# Patient Record
Sex: MALE | Race: WHITE | NOT HISPANIC OR LATINO | URBAN - METROPOLITAN AREA
[De-identification: names, ages, dates, MRNs, and addresses within clinical notes are randomized per-mention and may not be internally consistent; named-entity substitution may affect disease eponyms.]

---

## 2017-09-30 ENCOUNTER — LAB CONVERSION - ENCOUNTER (OUTPATIENT)
Dept: OTHER | Facility: OTHER | Age: 32
End: 2017-09-30

## 2017-09-30 LAB
A/G RATIO (HISTORICAL): 2.2 (CALC) (ref 1–2.5)
ALBUMIN SERPL BCP-MCNC: 4.9 G/DL (ref 3.6–5.1)
ALP SERPL-CCNC: 39 U/L (ref 40–115)
ALT SERPL W P-5'-P-CCNC: 32 U/L (ref 9–46)
AST SERPL W P-5'-P-CCNC: 22 U/L (ref 10–40)
BASOPHILS # BLD AUTO: 0.7 %
BASOPHILS # BLD AUTO: 29 CELLS/UL (ref 0–200)
BILIRUB SERPL-MCNC: 0.9 MG/DL (ref 0.2–1.2)
BUN SERPL-MCNC: 11 MG/DL (ref 7–25)
BUN/CREA RATIO (HISTORICAL): ABNORMAL (CALC) (ref 6–22)
CALCIUM SERPL-MCNC: 9.5 MG/DL (ref 8.6–10.3)
CHLORIDE SERPL-SCNC: 105 MMOL/L (ref 98–110)
CHOLEST SERPL-MCNC: 161 MG/DL
CHOLEST/HDLC SERPL: 2.3 (CALC)
CO2 SERPL-SCNC: 29 MMOL/L (ref 20–31)
CREAT SERPL-MCNC: 0.91 MG/DL (ref 0.6–1.35)
DEPRECATED RDW RBC AUTO: 12.4 % (ref 11–15)
EGFR AFRICAN AMERICAN (HISTORICAL): 130 ML/MIN/1.73M2
EGFR-AMERICAN CALC (HISTORICAL): 112 ML/MIN/1.73M2
EOSINOPHIL # BLD AUTO: 2 %
EOSINOPHIL # BLD AUTO: 82 CELLS/UL (ref 15–500)
GAMMA GLOBULIN (HISTORICAL): 2.2 G/DL (CALC) (ref 1.9–3.7)
GLUCOSE (HISTORICAL): 86 MG/DL (ref 65–99)
HCT VFR BLD AUTO: 38.6 % (ref 38.5–50)
HDLC SERPL-MCNC: 70 MG/DL
HGB BLD-MCNC: 13.6 G/DL (ref 13.2–17.1)
LDL CHOLESTEROL (HISTORICAL): 72 MG/DL (CALC)
LYMPHOCYTES # BLD AUTO: 1464 CELLS/UL (ref 850–3900)
LYMPHOCYTES # BLD AUTO: 35.7 %
MCH RBC QN AUTO: 31 PG (ref 27–33)
MCHC RBC AUTO-ENTMCNC: 35.2 G/DL (ref 32–36)
MCV RBC AUTO: 87.9 FL (ref 80–100)
MONOCYTES # BLD AUTO: 472 CELLS/UL (ref 200–950)
MONOCYTES (HISTORICAL): 11.5 %
NEUTROPHILS # BLD AUTO: 2054 CELLS/UL (ref 1500–7800)
NEUTROPHILS # BLD AUTO: 50.1 %
NON-HDL-CHOL (CHOL-HDL) (HISTORICAL): 91 MG/DL (CALC)
PLATELET # BLD AUTO: 174 THOUSAND/UL (ref 140–400)
PMV BLD AUTO: 11.3 FL (ref 7.5–12.5)
POTASSIUM SERPL-SCNC: 4.2 MMOL/L (ref 3.5–5.3)
RBC # BLD AUTO: 4.39 MILLION/UL (ref 4.2–5.8)
SODIUM SERPL-SCNC: 139 MMOL/L (ref 135–146)
TOTAL PROTEIN (HISTORICAL): 7.1 G/DL (ref 6.1–8.1)
TRIGL SERPL-MCNC: 109 MG/DL
TSH SERPL DL<=0.05 MIU/L-ACNC: 2.38 MIU/L (ref 0.4–4.5)
WBC # BLD AUTO: 4.1 THOUSAND/UL (ref 3.8–10.8)

## 2017-10-02 ENCOUNTER — GENERIC CONVERSION - ENCOUNTER (OUTPATIENT)
Dept: OTHER | Facility: OTHER | Age: 32
End: 2017-10-02

## 2017-10-03 ENCOUNTER — ALLSCRIPTS OFFICE VISIT (OUTPATIENT)
Dept: OTHER | Facility: OTHER | Age: 32
End: 2017-10-03

## 2018-01-14 NOTE — RESULT NOTES
Verified Results  (1) CBC/PLT/DIFF 17Sep2016 09:47AM Abigail Stewart     Test Name Result Flag Reference   WHITE BLOOD CELL COUNT 3 9 Thousand/uL  3 8-10 8   RED BLOOD CELL COUNT 4 35 Million/uL  4 20-5 80   HEMOGLOBIN 13 1 g/dL L 13 2-17 1   HEMATOCRIT 39 6 %  38 5-50 0   MCV 91 0 fL  80 0-100 0   MCH 30 2 pg  27 0-33 0   MCHC 33 2 g/dL  32 0-36 0   RDW 13 0 %  11 0-15 0   PLATELET COUNT 402 Thousand/uL  140-400   MPV 9 4 fL  7 5-11 5   ABSOLUTE NEUTROPHILS 1736 cells/uL  2292-2483   ABSOLUTE LYMPHOCYTES 1603 cells/uL  850-3900   ABSOLUTE MONOCYTES 441 cells/uL  200-950   ABSOLUTE EOSINOPHILS 109 cells/uL     ABSOLUTE BASOPHILS 12 cells/uL  0-200   NEUTROPHILS 44 5 %     LYMPHOCYTES 41 1 %     MONOCYTES 11 3 %     EOSINOPHILS 2 8 %     BASOPHILS 0 3 %       (1) COMPREHENSIVE METABOLIC PANEL [id number] 81:86JB Abigail Stewart     Test Name Result Flag Reference   GLUCOSE 85 mg/dL  65-99   Fasting reference interval   UREA NITROGEN (BUN) 13 mg/dL  7-25   CREATININE 0 98 mg/dL  0 60-1 35   eGFR NON-AFR   AMERICAN 103 mL/min/1 73m2  > OR = 60   eGFR AFRICAN AMERICAN 119 mL/min/1 73m2  > OR = 60   BUN/CREATININE RATIO   3-15   NOT APPLICABLE (calc)   SODIUM 136 mmol/L  135-146   POTASSIUM 4 0 mmol/L  3 5-5 3   CHLORIDE 101 mmol/L     CARBON DIOXIDE 27 mmol/L  20-31   CALCIUM 9 3 mg/dL  8 6-10 3   PROTEIN, TOTAL 7 0 g/dL  6 1-8 1   ALBUMIN 5 0 g/dL  3 6-5 1   GLOBULIN 2 0 g/dL (calc)  1 9-3 7   ALBUMIN/GLOBULIN RATIO 2 5 (calc)  1 0-2 5   BILIRUBIN, TOTAL 1 2 mg/dL  0 2-1 2   ALKALINE PHOSPHATASE 41 U/L     AST 19 U/L  10-40   ALT 28 U/L  9-46     (1) LIPID PANEL, FASTING 17Sep2016 09:47AM Abigail Stewart     Test Name Result Flag Reference   CHOLESTEROL, TOTAL 158 mg/dL  125-200   HDL CHOLESTEROL 97 mg/dL  > OR = 40   TRIGLICERIDES 61 mg/dL  <366   LDL-CHOLESTEROL 49 mg/dL (calc)  <130   Desirable range <100 mg/dL for patients with CHD or  diabetes and <70 mg/dL for diabetic patients with  known heart disease  CHOL/HDLC RATIO 1 6 (calc)  < OR = 5 0   NON HDL CHOLESTEROL 61 mg/dL (calc)     Target for non-HDL cholesterol is 30 mg/dL higher than   LDL cholesterol target  (Q) TSH, 3RD GENERATION 22Pnf0589 09:47AM Liza Shultz   REPORT COMMENT:  FASTING:YES     Test Name Result Flag Reference   TSH 1 91 mIU/L  0 40-4 50       Plan  Benign essential hypertension    · Losartan Potassium 25 MG Oral Tablet;  Take 1 tablet daily  Dyslipidemia    · Atorvastatin Calcium 10 MG Oral Tablet; take 1 tablet by mouth daily

## 2018-01-15 NOTE — PROGRESS NOTES
Assessment    1  Benign essential hypertension (401 1) (I10)   2  Dyslipidemia (272 4) (E78 5)   3  Needs flu shot (V04 81) (Z23)   4  Encounter for preventive health examination (V70 0) (Z00 00)    Plan  Benign essential hypertension, Dyslipidemia, Health Maintenance    · (1) CBC/PLT/DIFF; Status:Active; Requested QDZ:[id number];    · (1) COMPREHENSIVE METABOLIC PANEL; Status:Active; Requested NKH:[id number];    · (1) LIPID PANEL, FASTING; Status:Active; Requested for:05Oct2017;    · (1) TSH WITH FT4 REFLEX; Status:Active; Requested WOX:[id number];   Needs flu shot    · Fluzone Quadrivalent 0 5 ML Intramuscular Suspension; INJECT 0 5  ML  Intramuscular; To Be Done: [id number]  PMH: Dysfunction of left eustachian tube    · Meclizine HCl - 12 5 MG Oral Tablet    Discussion/Summary  Impression: health maintenance visit, healthy adult male  Currently, he eats a healthy diet and has an adequate exercise regimen  Testicular cancer screening: the risks and benefits of testicular cancer screening were discussed, self testicular exam technique was taught, monthly self testicular exam was advised and clinical testicular exam was done today  Colorectal cancer screening: colorectal cancer screening is not indicated  - Annual physical examination completed  Patient is generally in good health  Patient is current on tetanus immunization which was last done in 2012  -Hypertension is well-controlled on losartan 25 mg once daily    -Hyperlipidemia is well controlled on current dose of atorvastatin 10 mg once daily    - Flu vaccine administered in the office  -Patient given a repeat lab order for one year  If he does run out of medications and has not establish with a new PCP before that time then he can simply call the office for refills  He will be due for refills until March 2017  Possible side effects of new medications were reviewed with the patient/guardian today     The patient was counseled regarding diagnostic results, instructions for management, prognosis, impressions  Chief Complaint  Preventative visit  History of Present Illness  HM, Adult Male: The patient is being seen for a health maintenance evaluation  General Health: The patient's health since the last visit is described as good  He has regular dental visits  He denies vision problems  He denies hearing loss  Immunizations status: not up to date  Lifestyle:  He consumes a diverse and healthy diet  He does not have any weight concerns  He exercises regularly  He does not use tobacco  He denies alcohol use  Screening: cancer screening reviewed and current  metabolic screening reviewed and current  risk screening reviewed and current  HPI: Patient presents for annual physical examination  Patient's presents for followup of hypertension and hyperlipidemia  No particular complaints or concerns  Patient will be moving to [facility]  He will need to find a new PCP  Cholesterol profile remains very well-controlled with a total cholesterol 158, HDL of 97, triglycerides of 61 and LDL of 49  He continues on atorvastatin 10 mg once daily  Hypertension is well-controlled on losartan 25 mg once daily  Patient would like to receive flu vaccination      Review of Systems    Constitutional: No fever or chills, feels well, no tiredness, no recent weight gain or weight loss  Eyes: No complaints of eye pain, no red eyes, no discharge from eyes, no itchy eyes  ENT: no complaints of earache, no hearing loss, no nosebleeds, no nasal discharge, no sore throat, no hoarseness  Cardiovascular: No complaints of slow heart rate, no fast heart rate, no chest pain, no palpitations, no leg claudication, no lower extremity  Respiratory: No complaints of shortness of breath, no wheezing, no cough, no SOB on exertion, no orthopnea or PND  Gastrointestinal: No complaints of abdominal pain, no constipation, no nausea or vomiting, no diarrhea or bloody stools  Genitourinary: No complaints of dysuria, no incontinence, no hesitancy, no nocturia, no genital lesion, no testicular pain  Musculoskeletal: No complaints of arthralgia, no myalgias, no joint swelling or stiffness, no limb pain or swelling  Integumentary: No complaints of skin rash or skin lesions, no itching, no skin wound, no dry skin  Neurological: No compliants of headache, no confusion, no convulsions, no numbness or tingling, no dizziness or fainting, no limb weakness, no difficulty walking  Psychiatric: Is not suicidal, no sleep disturbances, no anxiety or depression, no change in personality, no emotional problems  Endocrine: No complaints of proptosis, no hot flashes, no muscle weakness, no erectile dysfunction, no deepening of the voice, no feelings of weakness  Hematologic/Lymphatic: No complaints of swollen glands, no swollen glands in the neck, does not bleed easily, no easy bruising  Active Problems    1  Abnormal Liver Function Test (790 6)   2  Anxiety disorder (300 00) (F41 9)   3  Benign essential hypertension (401 1) (I10)   4  Dyslipidemia (272 4) (E78 5)    Past Medical History    · History of Closed Bone Fracture (829 0)   · History of Cough (786 2) (R05)    Family History  Mother    · No pertinent family history  Family History    · Family history of Heart Disease (V17 49)   · Family history of Hyperlipidemia    Social History    · Never A Smoker    Current Meds   1  Atorvastatin Calcium 10 MG Oral Tablet; take 1 tablet by mouth daily; Therapy: 11LIR4630 to (Evaluate:18Mar2017)  Requested for: 67Oyf8671; Last   Rx:98Tkl9605 Ordered   2  Losartan Potassium 25 MG Oral Tablet; Take 1 tablet daily; Therapy: 89CKD2809 to (Evaluate:18Mar2017)  Requested for: 55Uhv7208; Last   Rx:65Mak1601 Ordered   3  Meclizine HCl - 12 5 MG Oral Tablet; TAKE 1 TABLET Every 8 hours PRN dizziness;    Therapy: 76RHN5195 to (Evaluate:30Mar2016)  Requested for: 06IUW7336; Last   Rx:10Mar2016 Ordered   4  Nasonex 50 MCG/ACT Nasal Suspension; USE 2 SPRAYS IN EACH NOSTRIL ONCE   DAILY; Therapy: 80AYG0706 to (Last Rx:10Mar2016)  Requested for: 68DRQ6033 Ordered    Allergies    1  No Known Drug Allergies    Vitals   Recorded: 68MDH6725 79:32WB   Systolic 403   Diastolic 72   Heart Rate 76   Respiration 14   Height 5 ft 6 in   Weight 147 lb    BMI Calculated 23 73   BSA Calculated 1 76     Physical Exam    Constitutional   General appearance: No acute distress, well appearing and well nourished  Eyes   Conjunctiva and lids: No erythema, swelling or discharge  Pupils and irises: Equal, round, reactive to light  Ophthalmoscopic examination: Normal fundi and optic discs  Ears, Nose, Mouth, and Throat   External inspection of ears and nose: Normal     Otoscopic examination: Tympanic membranes translucent with normal light reflex  Canals patent without erythema  Hearing: Normal     Nasal mucosa, septum, and turbinates: Normal without edema or erythema  Lips, teeth, and gums: Normal, good dentition  Oropharynx: Normal with no erythema, edema, exudate or lesions  Neck   Neck: Supple, symmetric, trachea midline, no masses  Thyroid: Normal, no thyromegaly  Pulmonary   Respiratory effort: No increased work of breathing or signs of respiratory distress  Percussion of chest: Normal     Palpation of chest: Normal     Auscultation of lungs: Clear to auscultation  Cardiovascular   Palpation of heart: Normal PMI, no thrills  Auscultation of heart: Normal rate and rhythm, normal S1 and S2, no murmurs  Carotid pulses: 2+ bilaterally  Abdominal aorta: Normal     Femoral pulses: 2+ bilaterally  Pedal pulses: 2+ bilaterally  Examination of extremities for edema and/or varicosities: Normal     Chest   Breasts: Normal, no dimpling or skin changes appreciated  Palpation of breasts and axillae: Normal, no masses palpated      Chest: Normal     Abdomen   Abdomen: Non-tender, no masses  Liver and spleen: No hepatomegaly or splenomegaly  Examination for hernias: No hernias appreciated  Genitourinary   Scrotal contents: Normal testes, no masses  Penis: Normal, no lesions  Lymphatic   Palpation of lymph nodes in neck: No lymphadenopathy  Palpation of lymph nodes in axillae: No lymphadenopathy  Palpation of lymph nodes in groin: No lymphadenopathy  Palpation of lymph nodes in other areas: No lymphadenopathy  Musculoskeletal   Gait and station: Normal     Inspection/palpation of digits and nails: Normal without clubbing or cyanosis  Inspection/palpation of joints, bones, and muscles: Normal     Range of motion: Normal     Stability: Normal     Muscle strength/tone: Normal     Skin   Skin and subcutaneous tissue: Normal without rashes or lesions  Palpation of skin and subcutaneous tissue: Normal turgor  Neurologic   Cranial nerves: Cranial nerves 2-12 intact  Reflexes: 2+ and symmetric  Sensation: No sensory loss  Psychiatric   Judgment and insight: Normal     Orientation to person, place and time: Normal     Recent and remote memory: Intact  Mood and affect: Normal        Results/Data  PHQ-2 Adult Depression Screening 50Qmx7358 08:03AM User, Ahs     Test Name Result Flag Reference   PHQ-2 Adult Depression Score 0     Over the last two weeks, how often have you been bothered by any of the following problems?   Little interest or pleasure in doing things: Not at all - 0  Feeling down, depressed, or hopeless: Not at all - 0   PHQ-2 Adult Depression Screening Negative         Signatures   Electronically signed by : Dougie Gama DO; Oct  5 2016  8:30AM EST                       (Author)

## 2018-01-15 NOTE — PROGRESS NOTES
Assessment    1  Encounter for preventive health examination (V70 0) (Z00 00)   2  Benign essential hypertension (401 1) (I10)   3  Needs flu shot (V04 81) (Z23)   4  Dyslipidemia (272 4) (E78 5)    Plan  Benign essential hypertension    · Losartan Potassium 25 MG Oral Tablet; Take 1 tablet daily  Benign essential hypertension, Dyslipidemia, Health Maintenance    · (1) CBC/PLT/DIFF; Status:Active; Requested for:30Axt7774;    · (1) COMPREHENSIVE METABOLIC PANEL; Status:Active; Requested OEU:[id number];    · (1) LIPID PANEL, FASTING; Status:Active; Requested for:17Gfa0247;    · (1) TSH WITH FT4 REFLEX; Status:Active; Requested assisted:21OSI6535; Dyslipidemia    · Atorvastatin Calcium 10 MG Oral Tablet; TAKE ONE (1) TABLET(S) DAILY AT  BEDTIME  Needs flu shot    · Fluzone Quadrivalent 0 5 ML Intramuscular Suspension Prefilled Syringe    Discussion/Summary  Impression: health maintenance visit, healthy adult male  Currently, he eats a healthy diet and has an adequate exercise regimen  Testicular cancer screening: the risks and benefits of testicular cancer screening were discussed, self testicular exam technique was taught, monthly self testicular exam was advised and clinical testicular exam was done today  Colorectal cancer screening: colorectal cancer screening is not indicated  - Annual physical examination completed  Patient is generally in good health  Patient is current on tetanus immunization which was last done in 2012  -Hypertension is well-controlled on losartan 25 mg once daily    -Hyperlipidemia is well controlled on current dose of atorvastatin 10 mg once daily    - Flu vaccine administered in the office  -Patient given a repeat lab order for one year  If he does run out of medications and has not establish with a new PCP before that time then he can simply call the office for refills  The patient was counseled regarding diagnostic results, instructions for management, prognosis, impressions  Possible side effects of new medications were reviewed with the patient/guardian today  The treatment plan was reviewed with the patient/guardian  The patient/guardian understands and agrees with the treatment plan   Possible side effects of new medications were reviewed with the patient/guardian today  Chief Complaint  Preventative visit  History of Present Illness  HM, Adult Male: The patient is being seen for a health maintenance evaluation  General Health: The patient's health since the last visit is described as good  He has regular dental visits  He denies vision problems  He denies hearing loss  Immunizations status: not up to date  Lifestyle:  He consumes a diverse and healthy diet  He does not have any weight concerns  He exercises regularly  He does not use tobacco  He denies alcohol use  Screening: cancer screening reviewed and current  metabolic screening reviewed and current  risk screening reviewed and current  HPI: Patient presents for annual physical examination and for followup of hypertension and hyperlipidemia  No particular complaints or concerns  Still plans on moving to the [place] area as his job 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 moving there  Cholesterol remains well controlled on atorvastatin 10 mg once daily and hypertension remains well controlled on losartan 25 mg once daily  No complaints or concerns  Patient would like to receive flu vaccine      Review of Systems    Constitutional: No fever or chills, feels well, no tiredness, no recent weight gain or weight loss  Eyes: No complaints of eye pain, no red eyes, no discharge from eyes, no itchy eyes  ENT: no complaints of earache, no hearing loss, no nosebleeds, no nasal discharge, no sore throat, no hoarseness  Cardiovascular: No complaints of slow heart rate, no fast heart rate, no chest pain, no palpitations, no leg claudication, no lower extremity     Respiratory: No complaints of shortness of breath, no wheezing, no cough, no SOB on exertion, no orthopnea or PND  Gastrointestinal: No complaints of abdominal pain, no constipation, no nausea or vomiting, no diarrhea or bloody stools  Genitourinary: No complaints of dysuria, no incontinence, no hesitancy, no nocturia, no genital lesion, no testicular pain  Musculoskeletal: No complaints of arthralgia, no myalgias, no joint swelling or stiffness, no limb pain or swelling  Integumentary: No complaints of skin rash or skin lesions, no itching, no skin wound, no dry skin  Neurological: No compliants of headache, no confusion, no convulsions, no numbness or tingling, no dizziness or fainting, no limb weakness, no difficulty walking  Psychiatric: Is not suicidal, no sleep disturbances, no anxiety or depression, no change in personality, no emotional problems  Endocrine: No complaints of proptosis, no hot flashes, no muscle weakness, no erectile dysfunction, no deepening of the voice, no feelings of weakness  Hematologic/Lymphatic: No complaints of swollen glands, no swollen glands in the neck, does not bleed easily, no easy bruising  Active Problems    1  Anxiety disorder (300 00) (F41 9)   2  Benign essential hypertension (401 1) (I10)   3  Dyslipidemia (272 4) (E78 5)   4  Needs flu shot (V04 81) (Z23)    Past Medical History    · History of Abnormal Liver Function Test (790 6)   · History of Closed Bone Fracture (829 0)   · History of Cough (786 2) (R05)    Family History  Family History    · Family history of Heart Disease (V17 49)   · Family history of Hyperlipidemia    Social History    · Never A Smoker    Current Meds   1  Atorvastatin Calcium 10 MG Oral Tablet; Take 1 tablet daily; Therapy: 69SZI6421 to (Evaluate:39Iku2112)  Requested for: 20Mar2017; Last   Rx:20Mar2017 Ordered   2  Losartan Potassium 25 MG Oral Tablet; Take 1 tablet daily; Therapy: 81WEX0432 to (Evaluate:59Akh9971)  Requested for: 20Mar2017; Last   Rx:20Mar2017 Ordered   3   Nasonex 50 MCG/ACT Nasal Suspension; USE 2 SPRAYS IN EACH NOSTRIL ONCE   DAILY; Therapy: 96QNA3453 to (Last Rx:10Mar2016)  Requested for: 22MTC5657 Ordered    Allergies    1  No Known Drug Allergies    Vitals   Recorded: 24FVP3847 08:37AM   Heart Rate 74   Respiration 16   Systolic 312   Diastolic 68   Height 5 ft 6 in   Weight 150 lb    BMI Calculated 24 21   BSA Calculated 1 77     Physical Exam    Constitutional   General appearance: No acute distress, well appearing and well nourished  Eyes   Conjunctiva and lids: No erythema, swelling or discharge  Pupils and irises: Equal, round, reactive to light  Ophthalmoscopic examination: Normal fundi and optic discs  Ears, Nose, Mouth, and Throat   External inspection of ears and nose: Normal     Otoscopic examination: Tympanic membranes translucent with normal light reflex  Canals patent without erythema  Hearing: Normal     Nasal mucosa, septum, and turbinates: Normal without edema or erythema  Lips, teeth, and gums: Normal, good dentition  Oropharynx: Normal with no erythema, edema, exudate or lesions  Neck   Neck: Supple, symmetric, trachea midline, no masses  Thyroid: Normal, no thyromegaly  Pulmonary   Respiratory effort: No increased work of breathing or signs of respiratory distress  Percussion of chest: Normal     Palpation of chest: Normal     Auscultation of lungs: Clear to auscultation  Cardiovascular   Palpation of heart: Normal PMI, no thrills  Auscultation of heart: Normal rate and rhythm, normal S1 and S2, no murmurs  Carotid pulses: 2+ bilaterally  Abdominal aorta: Normal     Femoral pulses: 2+ bilaterally  Pedal pulses: 2+ bilaterally  Examination of extremities for edema and/or varicosities: Normal     Chest   Breasts: Normal, no dimpling or skin changes appreciated  Palpation of breasts and axillae: Normal, no masses palpated  Chest: Normal     Abdomen   Abdomen: Non-tender, no masses      Liver and spleen: No hepatomegaly or splenomegaly  Examination for hernias: No hernias appreciated  Genitourinary   Scrotal contents: Normal testes, no masses  Penis: Normal, no lesions  Lymphatic   Palpation of lymph nodes in neck: No lymphadenopathy  Palpation of lymph nodes in axillae: No lymphadenopathy  Palpation of lymph nodes in groin: No lymphadenopathy  Palpation of lymph nodes in other areas: No lymphadenopathy  Musculoskeletal   Gait and station: Normal     Inspection/palpation of digits and nails: Normal without clubbing or cyanosis  Inspection/palpation of joints, bones, and muscles: Normal     Range of motion: Normal     Stability: Normal     Muscle strength/tone: Normal     Skin   Skin and subcutaneous tissue: Normal without rashes or lesions  Palpation of skin and subcutaneous tissue: Normal turgor  Neurologic   Cranial nerves: Cranial nerves 2-12 intact  Reflexes: 2+ and symmetric  Sensation: No sensory loss  Psychiatric   Judgment and insight: Normal     Orientation to person, place and time: Normal     Recent and remote memory: Intact  Mood and affect: Normal        Results/Data  PHQ-2 Adult Depression Screening 30LZI1152 08:41AM User, Ahs     Test Name Result Flag Reference   PHQ-2 Adult Depression Score 0     Over the last two weeks, how often have you been bothered by any of the following problems?   Little interest or pleasure in doing things: Not at all - 0  Feeling down, depressed, or hopeless: Not at all - 0   PHQ-2 Adult Depression Screening Negative         Signatures   Electronically signed by : Cory Muhammad DO; Oct  3 2017  9:10AM EST                       (Author)

## 2018-01-18 NOTE — RESULT NOTES
Verified Results  (1) LIPID PANEL, FASTING 97Ybb2901 09:59AM [facility]     Test Name Result Flag Reference   CHOLESTEROL, TOTAL 161 mg/dL  <200   HDL CHOLESTEROL 70 mg/dL  >30   TRIGLICERIDES 193 mg/dL  <150   LDL-CHOLESTEROL 72 mg/dL (calc)     Reference range: <100     Desirable range <100 mg/dL for patients with CHD or  diabetes and <70 mg/dL for diabetic patients with  known heart disease  LDL-C is now calculated using the Gabriel-Mendoza   calculation, which is a validated novel method providing   better accuracy than the Friedewald equation in the   estimation of LDL-C  Tiffanie Ross  Lakisha Branchty  5412;966(00): 1846-6033   (http://[employer]/faq/KVW433)   CHOL/HDLC RATIO 2 3 (calc)  <5 0   NON HDL CHOLESTEROL 91 mg/dL (calc)  <130   For patients with diabetes plus 1 major ASCVD risk   factor, treating to a non-HDL-C goal of <100 mg/dL   (LDL-C of <70 mg/dL) is considered a therapeutic   option  (1) COMPREHENSIVE METABOLIC PANEL 22SNY1899 96:84PD [facility]     Test Name Result Flag Reference   GLUCOSE 86 mg/dL  65-99   Fasting reference interval   UREA NITROGEN (BUN) 11 mg/dL  7-25   CREATININE 0 91 mg/dL  0 60-1 35   eGFR NON-AFR   AMERICAN 112 mL/min/1 73m2  > OR = 60   eGFR AFRICAN AMERICAN 130 mL/min/1 73m2  > OR = 60   BUN/CREATININE RATIO   9-91   NOT APPLICABLE (calc)   SODIUM 139 mmol/L  135-146   POTASSIUM 4 2 mmol/L  3 5-5 3   CHLORIDE 105 mmol/L     CARBON DIOXIDE 29 mmol/L  20-31   CALCIUM 9 5 mg/dL  8 6-10 3   PROTEIN, TOTAL 7 1 g/dL  6 1-8 1   ALBUMIN 4 9 g/dL  3 6-5 1   GLOBULIN 2 2 g/dL (calc)  1 9-3 7   ALBUMIN/GLOBULIN RATIO 2 2 (calc)  1 0-2 5   BILIRUBIN, TOTAL 0 9 mg/dL  0 2-1 2   ALKALINE PHOSPHATASE 39 U/L L    AST 22 U/L  10-40   ALT 32 U/L  9-46     (1) CBC/PLT/DIFF 12OMZ4091 09:59SUSSY Alvarez     Test Name Result Flag Reference   WHITE BLOOD CELL COUNT 4 1 Thousand/uL  3 8-10 8   RED BLOOD CELL COUNT 4 39 Million/uL  4 20-5 80 HEMOGLOBIN 13 6 g/dL  13 2-17 1   HEMATOCRIT 38 6 %  38 5-50 0   MCV 87 9 fL  80 0-100 0   MCH 31 0 pg  27 0-33 0   MCHC 35 2 g/dL  32 0-36 0   RDW 12 4 %  11 0-15 0   PLATELET COUNT 969 Thousand/uL  140-400   ABSOLUTE NEUTROPHILS 2054 cells/uL  7825-7798   ABSOLUTE LYMPHOCYTES 1464 cells/uL  850-3900   ABSOLUTE MONOCYTES 472 cells/uL  200-950   ABSOLUTE EOSINOPHILS 82 cells/uL     ABSOLUTE BASOPHILS 29 cells/uL  0-200   NEUTROPHILS 50 1 %     LYMPHOCYTES 35 7 %     MONOCYTES 11 5 %     EOSINOPHILS 2 0 %     BASOPHILS 0 7 %     MPV 11 3 fL  7 5-12 5     (Q) TSH, 3RD GENERATION W/REFLEX TO FT4 17Srx1686 09:59AM Kirsten Fernandez   REPORT COMMENT:  FOR REQ #S [id number] AND [id number]  FASTING:YES AN UPDATE OR CORRECTION HAS BEEN MADE TO NAME     Test Name Result Flag Reference   TSH W/REFLEX TO FT4 2 38 mIU/L  0 40-4 50

## 2018-01-22 VITALS
SYSTOLIC BLOOD PRESSURE: 118 MMHG | HEART RATE: 74 BPM | DIASTOLIC BLOOD PRESSURE: 68 MMHG | BODY MASS INDEX: 24.11 KG/M2 | WEIGHT: 150 LBS | HEIGHT: 66 IN | RESPIRATION RATE: 16 BRPM

## 2018-09-29 DIAGNOSIS — Z00.00 ENCOUNTER FOR GENERAL ADULT MEDICAL EXAMINATION WITHOUT ABNORMAL FINDINGS: ICD-10-CM

## 2018-09-29 DIAGNOSIS — E78.5 HYPERLIPIDEMIA: ICD-10-CM

## 2018-09-29 DIAGNOSIS — I10 ESSENTIAL (PRIMARY) HYPERTENSION: ICD-10-CM

## 2018-10-11 RX ORDER — ATORVASTATIN CALCIUM 10 MG/1
TABLET, FILM COATED ORAL
Qty: 90 TABLET | Refills: 3 | OUTPATIENT
Start: 2018-10-11

## 2018-10-11 RX ORDER — LOSARTAN POTASSIUM 25 MG/1
TABLET ORAL
Qty: 90 TABLET | Refills: 3 | OUTPATIENT
Start: 2018-10-11